# Patient Record
Sex: FEMALE | Race: OTHER
[De-identification: names, ages, dates, MRNs, and addresses within clinical notes are randomized per-mention and may not be internally consistent; named-entity substitution may affect disease eponyms.]

---

## 2022-09-28 ENCOUNTER — HOSPITAL ENCOUNTER (EMERGENCY)
Dept: HOSPITAL 4 - SED | Age: 67
Discharge: HOME | End: 2022-09-28
Payer: COMMERCIAL

## 2022-09-28 VITALS — WEIGHT: 252 LBS | HEIGHT: 67 IN | BODY MASS INDEX: 39.55 KG/M2

## 2022-09-28 VITALS — SYSTOLIC BLOOD PRESSURE: 135 MMHG
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
## 2022-09-28 DIAGNOSIS — S00.93XA: Primary | ICD-10-CM

## 2022-09-28 DIAGNOSIS — Z79.899: ICD-10-CM
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
## 2022-09-28 LAB
ALBUMIN SERPL BCP-MCNC: 3.7 G/DL (ref 3.4–4.8)
ALT SERPL W P-5'-P-CCNC: 22 U/L (ref 12–78)
ANION GAP SERPL CALCULATED.3IONS-SCNC: 11 MMOL/L (ref 5–15)
AST SERPL W P-5'-P-CCNC: 22 U/L (ref 10–37)
BASOPHILS # BLD AUTO: 0 K/UL (ref 0–0.2)
BASOPHILS NFR BLD AUTO: 0.5 % (ref 0–2)
BILIRUB SERPL-MCNC: 0.5 MG/DL (ref 0–1)
BUN SERPL-MCNC: 24 MG/DL (ref 8–21)
CALCIUM SERPL-MCNC: 9.3 MG/DL (ref 8.4–11)
CHLORIDE SERPL-SCNC: 104 MMOL/L (ref 98–107)
CREAT SERPL-MCNC: 1.09 MG/DL (ref 0.55–1.3)
EOSINOPHIL # BLD AUTO: 0.1 K/UL (ref 0–0.4)
EOSINOPHIL NFR BLD AUTO: 1.7 % (ref 0–4)
ERYTHROCYTE [DISTWIDTH] IN BLOOD BY AUTOMATED COUNT: 17.7 % (ref 9–15)
GFR SERPL CREATININE-BSD FRML MDRD: 65 ML/MIN (ref 90–?)
GLUCOSE SERPL-MCNC: 92 MG/DL (ref 70–99)
HCT VFR BLD AUTO: 34 % (ref 36–48)
LYMPHOCYTES # BLD AUTO: 1.1 K/UL (ref 1–5.5)
LYMPHOCYTES NFR BLD AUTO: 17.5 % (ref 20.5–51.5)
MCV RBC AUTO: 75 FL (ref 79–98)
MONOCYTES # BLD MANUAL: 0.5 K/UL (ref 0–1)
MONOCYTES # BLD MANUAL: 7.5 % (ref 1.7–9.3)
NEUTROPHILS # BLD AUTO: 4.8 K/UL (ref 1.8–7.7)
NEUTROPHILS NFR BLD AUTO: 72.8 % (ref 40–70)
PLATELET # BLD AUTO: 281 K/UL (ref 130–430)
POTASSIUM SERPL-SCNC: 4.6 MMOL/L (ref 3.5–5.1)
RBC # BLD AUTO: 4.53 MIL/UL (ref 4.2–6.2)
WBC # BLD AUTO: 6.6 K/UL (ref 4.8–10.8)

## 2022-09-28 NOTE — NUR
PT REFUSED EKG TO EVAL FOR POSSIBLE AFIB RVR. DR. RANGEL EDUCATED PT, BUT PT 
REFUSED PROCEDURE. RISKS AND BENEFITS EXPLAINED PT VERBALIZED UNDERSTANDING.

## 2022-09-28 NOTE — NUR
Patient does not wish to proceed with medical care recommended by DR. RANGEL.  
Patient given information related to possible complications, up to and 
including death, which could occur as a result of leaving hospital at this 
time.  Patient verbalizes understanding of risks involved leaving against 
medical advice.  Patient has signed AMA form.

## 2022-09-28 NOTE — NUR
RECEIVED PT FROM [facility] NURSE. PT BIBS WITH C/O MECH FALL, PT SLID AND FELL ON 
BUTTOCKS THEN FELL BACK AND HIT HEAD. PT HAS SMALL BUMP TO RIGHT BACK OF HEAD, 
SKIN INTACT. NO S/S OF REDNESS. PT TAKES ELIQUIS AND WANT TO RULE OUT BLEED. PT 
IS AAOX4. PERRL. PUPILS 3MM AND EQUAL BILATERALLY. RESP E/U. ON R/A. NORMAL 
S1S2 NOTED. DENIES N/V/D/C. SKIN WARM, CDI, NO PERIPHERAL EDEMA. DISTAL PULSE 
NORMAL. CAP REFILL < 3 SECS. DENIES PAIN.